# Patient Record
Sex: MALE | Race: WHITE | URBAN - METROPOLITAN AREA
[De-identification: names, ages, dates, MRNs, and addresses within clinical notes are randomized per-mention and may not be internally consistent; named-entity substitution may affect disease eponyms.]

---

## 2022-10-20 ENCOUNTER — WEB ENCOUNTER (OUTPATIENT)
Dept: URBAN - METROPOLITAN AREA CLINIC 96 | Facility: CLINIC | Age: 22
End: 2022-10-20

## 2022-11-03 ENCOUNTER — WEB ENCOUNTER (OUTPATIENT)
Dept: URBAN - METROPOLITAN AREA CLINIC 96 | Facility: CLINIC | Age: 22
End: 2022-11-03

## 2022-11-08 ENCOUNTER — OFFICE VISIT (OUTPATIENT)
Dept: URBAN - METROPOLITAN AREA CLINIC 96 | Facility: CLINIC | Age: 22
End: 2022-11-08
Payer: COMMERCIAL

## 2022-11-08 ENCOUNTER — LAB OUTSIDE AN ENCOUNTER (OUTPATIENT)
Dept: URBAN - METROPOLITAN AREA CLINIC 96 | Facility: CLINIC | Age: 22
End: 2022-11-08

## 2022-11-08 ENCOUNTER — WEB ENCOUNTER (OUTPATIENT)
Dept: URBAN - METROPOLITAN AREA CLINIC 96 | Facility: CLINIC | Age: 22
End: 2022-11-08

## 2022-11-08 VITALS
DIASTOLIC BLOOD PRESSURE: 87 MMHG | HEART RATE: 113 BPM | WEIGHT: 137 LBS | SYSTOLIC BLOOD PRESSURE: 128 MMHG | TEMPERATURE: 97.8 F | HEIGHT: 69 IN | BODY MASS INDEX: 20.29 KG/M2

## 2022-11-08 DIAGNOSIS — Z79.899 LONG-TERM USE OF IMMUNOSUPPRESSANT MEDICATION: ICD-10-CM

## 2022-11-08 DIAGNOSIS — K12.1 MOUTH ULCERS: ICD-10-CM

## 2022-11-08 DIAGNOSIS — K50.90 CROHN DISEASE: ICD-10-CM

## 2022-11-08 DIAGNOSIS — K59.01 CONSTIPATION: ICD-10-CM

## 2022-11-08 DIAGNOSIS — Z91.89 COLON CANCER HIGH RISK: ICD-10-CM

## 2022-11-08 PROCEDURE — 99205 OFFICE O/P NEW HI 60 MIN: CPT | Performed by: INTERNAL MEDICINE

## 2022-11-08 RX ORDER — ADALIMUMAB 40MG/0.4ML
1 PEN KIT SUBCUTANEOUS
Qty: 1 | Refills: 11 | OUTPATIENT
Start: 2022-11-08 | End: 2022-11-20

## 2022-11-08 RX ORDER — ADALIMUMAB 40MG/0.4ML
0.4 ML KIT SUBCUTANEOUS EVERY 2 WEEKS
Status: ACTIVE | COMMUNITY

## 2022-11-08 NOTE — HPI-TODAY'S VISIT:
Pt Head is a 21 y/o with colonic CD, currently on Humira every 2 weeks, here for eval of his condition. . Pt is referred by Dr. Henning.  . Pt was dxd with CD around age 12.  He has been on MTX, which he had lot of side effects to.  He has been on Humira since 2016.  Last colonoscopy was around 2018 (scope looked good). . Today on 11/8/2022, pt reports that he has gotten depressed.  Also developed a pneumonia recently on ABX for this.  Developing constipation since starting ABX.  Normal he has 2-4 BM per day. No blood in the stool.  Weight has gone down a little, has some mouth sores as well. . 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

## 2022-11-14 ENCOUNTER — OUT OF OFFICE VISIT (OUTPATIENT)
Dept: URBAN - METROPOLITAN AREA MEDICAL CENTER 16 | Facility: MEDICAL CENTER | Age: 22
End: 2022-11-14
Payer: COMMERCIAL

## 2022-11-14 DIAGNOSIS — R74.01 ABNORMAL/ELEVATED TRANSAMINASE (SGOT, AMINOTRANSFERASE): ICD-10-CM

## 2022-11-14 DIAGNOSIS — K50.90 ABDOMINAL PAIN DESPITE THERAPY FOR CROHN'S DISEASE: ICD-10-CM

## 2022-11-14 PROCEDURE — G8427 DOCREV CUR MEDS BY ELIG CLIN: HCPCS | Performed by: INTERNAL MEDICINE

## 2022-11-14 PROCEDURE — 99221 1ST HOSP IP/OBS SF/LOW 40: CPT | Performed by: INTERNAL MEDICINE

## 2022-11-14 PROCEDURE — 99232 SBSQ HOSP IP/OBS MODERATE 35: CPT | Performed by: INTERNAL MEDICINE

## 2022-11-18 LAB
A/G RATIO: 0.6
ABSOLUTE BASOPHILS: 49
ABSOLUTE EOSINOPHILS: 65
ABSOLUTE LYMPHOCYTES: 2673
ABSOLUTE MONOCYTES: 1426
ABSOLUTE NEUTROPHILS: (no result)
ADALIMUMAB AB, IBD: <10
ADALIMUMAB AB, IBD: <10
ADALIMUMAB LEVEL, IBD: 3.4
ALBUMIN: 2.4
ALKALINE PHOSPHATASE: 217
ALT (SGPT): 33
AST (SGOT): 43
BASOPHILS: 0.3
BILIRUBIN, TOTAL: 0.5
BUN/CREATININE RATIO: (no result)
BUN: 15
C-REACTIVE PROTEIN, QUANT: 251.9
CALCIUM: 7.8
CARBON DIOXIDE, TOTAL: 24
CHLORIDE: 98
COMMENT: (no result)
CREATININE: 0.68
EGFR: 136
EOSINOPHILS: 0.4
FERRITIN, SERUM: 999
GLOBULIN, TOTAL: 4.1
GLUCOSE: 84
HEMATOCRIT: 36
HEMOGLOBIN: 12.2
INTERPRETATION: (no result)
IRON BIND.CAP.(TIBC): 170
IRON SATURATION: 9
IRON: 16
LYMPHOCYTES: 16.5
MAGNESIUM: 2
MCH: 28.9
MCHC: 33.9
MCV: 85.3
MITOGEN-NIL: 1.99
MONOCYTES: 8.8
MPV: 9.5
NEUTROPHILS: 74
NIL: 0.08
PLATELET COUNT: 347
POTASSIUM: 5.4
PROTEIN, TOTAL: 6.5
QUANTIFERON(R)-TB GOLD: NEGATIVE
RDW: 11.9
RED BLOOD CELL COUNT: 4.22
SED RATE BY MODIFIED: 43
SODIUM: 132
TB1-NIL: <0
TB2-NIL: 0.02
VITAMIN B12: 1400
VITAMIN D,25-OH,TOTAL,IA: 44
WHITE BLOOD CELL COUNT: 16.2

## 2022-11-21 ENCOUNTER — TELEPHONE ENCOUNTER (OUTPATIENT)
Dept: URBAN - METROPOLITAN AREA CLINIC 92 | Facility: CLINIC | Age: 22
End: 2022-11-21

## 2022-11-25 ENCOUNTER — TELEPHONE ENCOUNTER (OUTPATIENT)
Dept: URBAN - METROPOLITAN AREA CLINIC 92 | Facility: CLINIC | Age: 22
End: 2022-11-25

## 2022-12-23 ENCOUNTER — OFFICE VISIT (OUTPATIENT)
Dept: URBAN - METROPOLITAN AREA TELEHEALTH 2 | Facility: TELEHEALTH | Age: 22
End: 2022-12-23
Payer: COMMERCIAL

## 2022-12-23 ENCOUNTER — LAB OUTSIDE AN ENCOUNTER (OUTPATIENT)
Dept: URBAN - METROPOLITAN AREA TELEHEALTH 2 | Facility: TELEHEALTH | Age: 22
End: 2022-12-23

## 2022-12-23 VITALS — WEIGHT: 128 LBS | BODY MASS INDEX: 18.96 KG/M2 | HEIGHT: 69 IN

## 2022-12-23 DIAGNOSIS — Z91.89 COLON CANCER HIGH RISK: ICD-10-CM

## 2022-12-23 DIAGNOSIS — R79.89 ELEVATED LFTS: ICD-10-CM

## 2022-12-23 DIAGNOSIS — K50.90 CROHN DISEASE: ICD-10-CM

## 2022-12-23 DIAGNOSIS — R76.8 HEPATITIS B CORE ANTIBODY POSITIVE: ICD-10-CM

## 2022-12-23 DIAGNOSIS — Z79.899 LONG-TERM USE OF IMMUNOSUPPRESSANT MEDICATION: ICD-10-CM

## 2022-12-23 DIAGNOSIS — K59.01 CONSTIPATION: ICD-10-CM

## 2022-12-23 DIAGNOSIS — K12.1 MOUTH ULCERS: ICD-10-CM

## 2022-12-23 PROCEDURE — 99215 OFFICE O/P EST HI 40 MIN: CPT | Performed by: INTERNAL MEDICINE

## 2022-12-23 RX ORDER — ADALIMUMAB 40MG/0.4ML
0.4 ML KIT SUBCUTANEOUS EVERY 2 WEEKS
COMMUNITY

## 2022-12-23 NOTE — HPI-TODAY'S VISIT:
Lars Jernigan is a 21 y/o with colonic CD, currently on Humira every 2 weeks, here for eval of his condition.  Pt is referred by Dr. Henning.  Pt was dxd with CD around age 12.  He has been on MTX, which he had lot of side effects to.  He has been on Humira since 2016.  Last colonoscopy was around 2018 (scope looked good).  Today on 11/8/2022, pt reports that he has gotten depressed.  Also developed a pneumonia recently on ABX for this.  Developing constipation since starting ABX.  Normal he has 2-4 BM per day. No blood in the stool.  Weight has gone down a little, has some mouth sores as well.  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  Seen by me soon after the appt with Dr. Parham, when he was in the hospital: Mr. Jernigan is a 23 yo male with history of Crohn's disease (on Humira) presenting to [facility] ED on 11/12 with cough and fever x 10 days. CXR in ED suggestive of diffuse consolidating infiltrates in the right lung suspicious for pneumonia/pneumonitis. Additionally, found to have transaminitis with positive Hep B core IgM on acute hepatitis panel. Hep B surface Ag negative. GI consulted for evaluation.   Patient reports no history of liver concerns/infection.  Denies hepatitis risk factors including accidental needlestick, sharing needles, recent tattoos, and new sexual partner.  Admits to frequent alcohol use during fraternity parties (none in the past couple weeks), daily vaping, and occasional marijuana use. Denies frequent NSAID/acetaminophen use. Denies abdominal pain/distension, n/v, hematemesis, hematochezia, and melena at this time.    Patient has a history of Crohn's disease managed on Humira.  Unable to take at this time due to current pneumonia infection.  Reports persistent, watery, non-bloody diarrhea during this admission. GI panel negative. He is a new patient of Dr. Parham.  Last colonoscopy ~5 years ago, reportedly normal.    Labs AST/ALT 35/54 from 44/66 on admission T bili 0.3, alk phos 138 from 166 on admission INR 1.21 WBC 20.00 Hgb 11.2, Hct 31.9, MCV 83.3   Prior Procedures Last colonoscopy ~5 years ago, reportedly normal.  No prior EGD.   Positive hepB surface Ab indicative of immunity to hepB, therefore ok to resume Humira once his current infection is controlled Will followup hepB PCR to be thorough - it was negative   CT 11/16/2022 IMPRESSION: : Extensive opacification throughout the right lung with multiple cystic/necrotic areas, consistent with extensive pneumonia. Fungal, tuberculous pneumonia may have this appearance.

## 2022-12-27 ENCOUNTER — TELEPHONE ENCOUNTER (OUTPATIENT)
Dept: URBAN - METROPOLITAN AREA CLINIC 118 | Facility: CLINIC | Age: 22
End: 2022-12-27

## 2022-12-29 ENCOUNTER — LAB OUTSIDE AN ENCOUNTER (OUTPATIENT)
Dept: URBAN - METROPOLITAN AREA CLINIC 96 | Facility: CLINIC | Age: 22
End: 2022-12-29

## 2022-12-30 LAB
ALBUMIN/GLOBULIN RATIO: 1.1
ALBUMIN: 3.8
ALKALINE PHOSPHATASE: 105
ALT (SGPT): 40
AST (SGOT): 21
BILIRUBIN, DIRECT: 0.1
BILIRUBIN, INDIRECT: 0.2
BILIRUBIN, TOTAL: 0.3
GLOBULIN: 3.4
HEPATITIS B SURFACE AB IMMUNITY, QN: 5
HEPATITIS B SURFACE ANTIGEN: (no result)
PROTEIN, TOTAL: 7.2
SED RATE BY MODIFIED: 129

## 2023-01-06 ENCOUNTER — OFFICE VISIT (OUTPATIENT)
Dept: URBAN - METROPOLITAN AREA TELEHEALTH 2 | Facility: TELEHEALTH | Age: 23
End: 2023-01-06

## 2023-01-09 ENCOUNTER — OFFICE VISIT (OUTPATIENT)
Dept: URBAN - METROPOLITAN AREA CLINIC 118 | Facility: CLINIC | Age: 23
End: 2023-01-09
Payer: COMMERCIAL

## 2023-01-09 VITALS
WEIGHT: 136 LBS | HEIGHT: 69 IN | SYSTOLIC BLOOD PRESSURE: 129 MMHG | BODY MASS INDEX: 20.14 KG/M2 | DIASTOLIC BLOOD PRESSURE: 72 MMHG | HEART RATE: 99 BPM

## 2023-01-09 DIAGNOSIS — K50.90 CROHN DISEASE: ICD-10-CM

## 2023-01-09 DIAGNOSIS — Z91.89 COLON CANCER HIGH RISK: ICD-10-CM

## 2023-01-09 DIAGNOSIS — Z79.899 LONG-TERM USE OF IMMUNOSUPPRESSANT MEDICATION: ICD-10-CM

## 2023-01-09 DIAGNOSIS — R76.8 HEPATITIS B CORE ANTIBODY POSITIVE: ICD-10-CM

## 2023-01-09 DIAGNOSIS — R79.89 ELEVATED LFTS: ICD-10-CM

## 2023-01-09 DIAGNOSIS — K59.01 CONSTIPATION: ICD-10-CM

## 2023-01-09 DIAGNOSIS — K12.1 MOUTH ULCERS: ICD-10-CM

## 2023-01-09 PROBLEM — 14760008 CONSTIPATION: Status: ACTIVE | Noted: 2022-11-08

## 2023-01-09 PROBLEM — 34000006 CROHN DISEASE: Status: ACTIVE | Noted: 2022-11-08

## 2023-01-09 PROCEDURE — 99214 OFFICE O/P EST MOD 30 MIN: CPT | Performed by: INTERNAL MEDICINE

## 2023-01-09 RX ORDER — ADALIMUMAB 40MG/0.4ML
0.4 ML KIT SUBCUTANEOUS EVERY 2 WEEKS
Status: ACTIVE | COMMUNITY

## 2023-01-09 NOTE — HPI-TODAY'S VISIT:
Lars Jernigan is a 23 y/o with colonic CD, currently on Humira every 2 weeks, here for followup after teleleath last month in brief had BAD pulm infection, we were consulted for poss hepB (doesn't have) LFT elevated but trending down back on humira now, was being held due to the infection    Pt was dxd with CD around age 12.  He has been on MTX, which he had lot of side effects to.  He has been on Humira since 2016.  Last colonoscopy was around 2018 (scope looked good).  Today on 11/8/2022, pt reports that he has gotten depressed.  Also developed a pneumonia recently on ABX for this.  Developing constipation since starting ABX.  Normal he has 2-4 BM per day. No blood in the stool.  Weight has gone down a little, has some mouth sores as well.  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.  Seen by me soon after the appt with Dr. Parham, when he was in the hospital: Mr. Jernigan is a 23 yo male with history of Crohn's disease (on Humira) presenting to [facility] ED on 11/12 with cough and fever x 10 days. CXR in ED suggestive of diffuse consolidating infiltrates in the right lung suspicious for pneumonia/pneumonitis. Additionally, found to have transaminitis with positive Hep B core IgM on acute hepatitis panel. Hep B surface Ag negative. GI consulted for evaluation.   Patient reports no history of liver concerns/infection.  Denies hepatitis risk factors including accidental needlestick, sharing needles, recent tattoos, and new sexual partner.  Admits to frequent alcohol use during fraternity parties (none in the past couple weeks), daily vaping, and occasional marijuana use. Denies frequent NSAID/acetaminophen use. Denies abdominal pain/distension, n/v, hematemesis, hematochezia, and melena at this time.    Patient has a history of Crohn's disease managed on Humira.  Unable to take at this time due to current pneumonia infection.  Reports persistent, watery, non-bloody diarrhea during this admission. GI panel negative. He is a new patient of Dr. Parham.  Last colonoscopy ~5 years ago, reportedly normal.    Labs AST/ALT 35/54 from 44/66 on admission T bili 0.3, alk phos 138 from 166 on admission INR 1.21 WBC 20.00 Hgb 11.2, Hct 31.9, MCV 83.3   Prior Procedures Last colonoscopy ~5 years ago, reportedly normal.  No prior EGD.   Positive hepB surface Ab indicative of immunity to hepB, therefore ok to resume Humira once his current infection is controlled Will followup hepB PCR to be thorough - it was negative   CT 11/16/2022 IMPRESSION: : Extensive opacification throughout the right lung with multiple cystic/necrotic areas, consistent with extensive pneumonia. Fungal, tuberculous pneumonia may have this appearance.

## 2023-02-20 ENCOUNTER — LAB OUTSIDE AN ENCOUNTER (OUTPATIENT)
Dept: URBAN - METROPOLITAN AREA CLINIC 118 | Facility: CLINIC | Age: 23
End: 2023-02-20

## 2023-03-07 ENCOUNTER — LAB OUTSIDE AN ENCOUNTER (OUTPATIENT)
Dept: URBAN - METROPOLITAN AREA CLINIC 118 | Facility: CLINIC | Age: 23
End: 2023-03-07

## 2023-03-08 LAB
ADALIMUMAB AB, IBD: <10
ADALIMUMAB AB, IBD: <10
ADALIMUMAB LEVEL, IBD: 5.9
ALBUMIN/GLOBULIN RATIO: 1.7
ALBUMIN: 4.3
ALKALINE PHOSPHATASE: 70
ALT (SGPT): 24
AST (SGOT): 22
BILIRUBIN, DIRECT: 0.1
BILIRUBIN, INDIRECT: 0.4
BILIRUBIN, TOTAL: 0.5
COMMENT: (no result)
GLOBULIN: 2.5
INTERPRETATION: (no result)
PROTEIN, TOTAL: 6.8

## 2023-03-20 ENCOUNTER — OFFICE VISIT (OUTPATIENT)
Dept: URBAN - METROPOLITAN AREA TELEHEALTH 2 | Facility: TELEHEALTH | Age: 23
End: 2023-03-20

## 2023-12-04 ENCOUNTER — TELEPHONE ENCOUNTER (OUTPATIENT)
Dept: URBAN - METROPOLITAN AREA CLINIC 118 | Facility: CLINIC | Age: 23
End: 2023-12-04

## 2023-12-04 RX ORDER — ADALIMUMAB 40MG/0.4ML
0.4 ML KIT SUBCUTANEOUS EVERY 2 WEEKS
Qty: 2 | Refills: 0

## 2023-12-22 ENCOUNTER — DASHBOARD ENCOUNTERS (OUTPATIENT)
Age: 23
End: 2023-12-22

## 2023-12-22 ENCOUNTER — OFFICE VISIT (OUTPATIENT)
Dept: URBAN - METROPOLITAN AREA CLINIC 109 | Facility: CLINIC | Age: 23
End: 2023-12-22
Payer: COMMERCIAL

## 2023-12-22 ENCOUNTER — LAB OUTSIDE AN ENCOUNTER (OUTPATIENT)
Dept: URBAN - METROPOLITAN AREA CLINIC 109 | Facility: CLINIC | Age: 23
End: 2023-12-22

## 2023-12-22 VITALS
HEIGHT: 69 IN | HEART RATE: 45 BPM | TEMPERATURE: 97.7 F | DIASTOLIC BLOOD PRESSURE: 71 MMHG | WEIGHT: 148.8 LBS | BODY MASS INDEX: 22.04 KG/M2 | SYSTOLIC BLOOD PRESSURE: 126 MMHG

## 2023-12-22 DIAGNOSIS — K59.01 CONSTIPATION: ICD-10-CM

## 2023-12-22 DIAGNOSIS — K50.90 CROHN DISEASE: ICD-10-CM

## 2023-12-22 DIAGNOSIS — K12.1 MOUTH ULCERS: ICD-10-CM

## 2023-12-22 DIAGNOSIS — Z79.899 LONG-TERM USE OF IMMUNOSUPPRESSANT MEDICATION: ICD-10-CM

## 2023-12-22 DIAGNOSIS — R79.89 ELEVATED LFTS: ICD-10-CM

## 2023-12-22 DIAGNOSIS — Z91.89 COLON CANCER HIGH RISK: ICD-10-CM

## 2023-12-22 PROCEDURE — 99213 OFFICE O/P EST LOW 20 MIN: CPT | Performed by: INTERNAL MEDICINE

## 2023-12-22 RX ORDER — LISDEXAMFETAMINE DIMESYLATE 40 MG/1
TAKE ONE CAPSULE BY MOUTH ONE TIME DAILY CAPSULE ORAL
Qty: 10 UNSPECIFIED | Refills: 0 | Status: ACTIVE | COMMUNITY

## 2023-12-22 RX ORDER — ZOLPIDEM TARTRATE 5 MG/1
TABLET ORAL
Qty: 30 TABLET | Status: ACTIVE | COMMUNITY

## 2023-12-22 RX ORDER — ADALIMUMAB 40MG/0.4ML
0.4 ML KIT SUBCUTANEOUS EVERY 2 WEEKS
Qty: 2 | Refills: 0 | Status: ACTIVE | COMMUNITY

## 2023-12-22 RX ORDER — ALBUTEROL SULFATE 2.5 MG/3ML
SOLUTION RESPIRATORY (INHALATION)
Qty: 75 MILLILITER | Status: ACTIVE | COMMUNITY

## 2023-12-22 RX ORDER — DEXTROAMPHETAMINE SULFATE, DEXTROAMPHETAMINE SACCHARATE, AMPHETAMINE SULFATE AND AMPHETAMINE ASPARTATE 5; 5; 5; 5 MG/1; MG/1; MG/1; MG/1
CAPSULE, EXTENDED RELEASE ORAL
Qty: 30 CAPSULE | Status: ON HOLD | COMMUNITY

## 2023-12-22 RX ORDER — ADALIMUMAB 40MG/0.4ML
0.4 ML KIT SUBCUTANEOUS EVERY 2 WEEKS
Qty: 2 | Refills: 11

## 2023-12-22 NOTE — HPI-TODAY'S VISIT:
Last seen early 2023 denies any flares since then  cut back on the marijuana, reports no longer abusing Rx opiates (was in a bad place last year)  reports taking the humira every two weeks, cannot tell me thedates or days he takes it though, reports his mom tracks all that  finishing up last year [employer] in  ([employer]). He says boring but easy   Pt Delon is a 21 y/o with colonic CD, currently on Humira every 2 weeks, here for followup after teleleath last month in brief had BAD pulm infection, we were consulted for poss hepB (doesn't have) LFT elevated but trending down back on humira now, was being held due to the infection    Pt was dxd with CD around age 12.  He has been on MTX, which he had lot of side effects to.  He has been on Humira since 2016.  Last colonoscopy was around 2018 (scope looked good).  Today on 11/8/2022, pt reports that he has gotten depressed.  Also developed a pneumonia recently on ABX for this.  Developing constipation since starting ABX.  Normal he has 2-4 BM per day. No blood in the stool.  Weight has gone down a little, has some mouth sores as well.  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  Seen by me soon after the appt with Dr. Parham, when he was in the hospital: Mr. Jernigan is a 21 yo male with history of Crohn's disease (on Humira) presenting to [facility] ED on 11/12 with cough and fever x 10 days. CXR in ED suggestive of diffuse consolidating infiltrates in the right lung suspicious for pneumonia/pneumonitis. Additionally, found to have transaminitis with positive Hep B core IgM on acute hepatitis panel. Hep B surface Ag negative. GI consulted for evaluation.   Patient reports no history of liver concerns/infection.  Denies hepatitis risk factors including accidental needlestick, sharing needles, recent tattoos, and new sexual partner.  Admits to frequent alcohol use during fraternity parties (none in the past couple weeks), daily vaping, and occasional marijuana use. Denies frequent NSAID/acetaminophen use. Denies abdominal pain/distension, n/v, hematemesis, hematochezia, and melena at this time.    Patient has a history of Crohn's disease managed on Humira.  Unable to take at this time due to current pneumonia infection.  Reports persistent, watery, non-bloody diarrhea during this admission. GI panel negative. He is a new patient of Dr. Parham.  Last colonoscopy ~5 years ago, reportedly normal.    Labs AST/ALT 35/54 from 44/66 on admission T bili 0.3, alk phos 138 from 166 on admission INR 1.21 WBC 20.00 Hgb 11.2, Hct 31.9, MCV 83.3   Prior Procedures Last colonoscopy ~5 years ago, reportedly normal.  No prior EGD.   Positive hepB surface Ab indicative of immunity to hepB, therefore ok to resume Humira once his current infection is controlled Will followup hepB PCR to be thorough - it was negative   CT 11/16/2022 IMPRESSION: : Extensive opacification throughout the right lung with multiple cystic/necrotic areas, consistent with extensive pneumonia. Fungal, tuberculous pneumonia may have this appearance.

## 2024-01-18 ENCOUNTER — TELEPHONE ENCOUNTER (OUTPATIENT)
Dept: URBAN - METROPOLITAN AREA CLINIC 6 | Facility: CLINIC | Age: 24
End: 2024-01-18

## 2025-02-23 ENCOUNTER — TELEPHONE ENCOUNTER (OUTPATIENT)
Dept: URBAN - METROPOLITAN AREA CLINIC 109 | Facility: CLINIC | Age: 25
End: 2025-02-23

## 2025-02-23 RX ORDER — ADALIMUMAB 40MG/0.4ML
0.4 ML KIT SUBCUTANEOUS EVERY 2 WEEKS
Qty: 6 KIT | Refills: 0

## 2025-02-24 ENCOUNTER — P2P PATIENT RECORD (OUTPATIENT)
Age: 25
End: 2025-02-24

## 2025-03-12 ENCOUNTER — OFFICE VISIT (OUTPATIENT)
Dept: URBAN - METROPOLITAN AREA CLINIC 118 | Facility: CLINIC | Age: 25
End: 2025-03-12
Payer: COMMERCIAL

## 2025-03-12 ENCOUNTER — P2P PATIENT RECORD (OUTPATIENT)
Age: 25
End: 2025-03-12

## 2025-03-12 VITALS
BODY MASS INDEX: 21.56 KG/M2 | WEIGHT: 145.6 LBS | HEIGHT: 69 IN | TEMPERATURE: 97.9 F | DIASTOLIC BLOOD PRESSURE: 75 MMHG | SYSTOLIC BLOOD PRESSURE: 117 MMHG | HEART RATE: 71 BPM

## 2025-03-12 DIAGNOSIS — K50.90 CROHN DISEASE: ICD-10-CM

## 2025-03-12 DIAGNOSIS — Z91.89 COLON CANCER HIGH RISK: ICD-10-CM

## 2025-03-12 DIAGNOSIS — K12.1 MOUTH ULCERS: ICD-10-CM

## 2025-03-12 DIAGNOSIS — R79.89 ELEVATED LFTS: ICD-10-CM

## 2025-03-12 DIAGNOSIS — K59.01 CONSTIPATION: ICD-10-CM

## 2025-03-12 DIAGNOSIS — Z79.899 LONG-TERM USE OF IMMUNOSUPPRESSANT MEDICATION: ICD-10-CM

## 2025-03-12 PROCEDURE — 99214 OFFICE O/P EST MOD 30 MIN: CPT | Performed by: INTERNAL MEDICINE

## 2025-03-12 RX ORDER — ZOLPIDEM TARTRATE 5 MG/1
TABLET ORAL
Qty: 30 TABLET | COMMUNITY

## 2025-03-12 RX ORDER — DEXTROAMPHETAMINE SULFATE, DEXTROAMPHETAMINE SACCHARATE, AMPHETAMINE SULFATE AND AMPHETAMINE ASPARTATE 5; 5; 5; 5 MG/1; MG/1; MG/1; MG/1
CAPSULE, EXTENDED RELEASE ORAL
Qty: 30 CAPSULE | Status: ON HOLD | COMMUNITY

## 2025-03-12 RX ORDER — ADALIMUMAB 40MG/0.4ML
0.4 ML KIT SUBCUTANEOUS EVERY 2 WEEKS
Qty: 2 | Refills: 11

## 2025-03-12 RX ORDER — ADALIMUMAB 40MG/0.4ML
0.4 ML KIT SUBCUTANEOUS EVERY 2 WEEKS
Qty: 6 KIT | Refills: 0 | COMMUNITY

## 2025-03-12 RX ORDER — LISDEXAMFETAMINE DIMESYLATE 40 MG/1
TAKE ONE CAPSULE BY MOUTH ONE TIME DAILY CAPSULE ORAL
Qty: 10 UNSPECIFIED | Refills: 0 | COMMUNITY

## 2025-03-12 RX ORDER — ALBUTEROL SULFATE 2.5 MG/3ML
SOLUTION RESPIRATORY (INHALATION)
Qty: 75 MILLILITER | COMMUNITY

## 2025-03-12 NOTE — HPI-TODAY'S VISIT:
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  He has been on MTX, which he had lot of side effects to.  He has been on Humira since 2016.  Last colonoscopy was around 2018 (scope looked good).  Today on 11/8/2022, pt reports that he has gotten depressed.  Also developed a pneumonia recently on ABX for this.  Developing constipation since starting ABX.  Normal he has 2-4 BM per day. No blood in the stool.  Weight has gone down a little, has some mouth sores as well.  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  Seen by me soon after the appt with Dr. Parham, when he was in the hospital: Mr. Jernigan is a 21 yo male with history of Crohn's disease (on Humira) presenting to [facility] ED on 11/12 with cough and fever x 10 days. CXR in ED suggestive of diffuse consolidating infiltrates in the right lung suspicious for pneumonia/pneumonitis. Additionally, found to have transaminitis with positive Hep B core IgM on acute hepatitis panel. Hep B surface Ag negative. GI consulted for evaluation.   Patient reports no history of liver concerns/infection.  Denies hepatitis risk factors including accidental needlestick, sharing needles, recent tattoos, and new sexual partner.  Admits to frequent alcohol use during fraternity parties (none in the past couple weeks), daily vaping, and occasional marijuana use. Denies frequent NSAID/acetaminophen use. Denies abdominal pain/distension, n/v, hematemesis, hematochezia, and melena at this time.    Patient has a history of Crohn's disease managed on Humira.  Unable to take at this time due to current pneumonia infection.  Reports persistent, watery, non-bloody diarrhea during this admission. GI panel negative. He is a new patient of Dr. Parham.  Last colonoscopy ~5 years ago, reportedly normal.    Labs AST/ALT 35/54 from 44/66 on admission T bili 0.3, alk phos 138 from 166 on admission INR 1.21 WBC 20.00 Hgb 11.2, Hct 31.9, MCV 83.3   Prior Procedures Last colonoscopy ~5 years ago, reportedly normal.  No prior EGD.   Positive hepB surface Ab indicative of immunity to hepB, therefore ok to resume Humira once his current infection is controlled Will followup hepB PCR to be thorough - it was negative   CT 11/16/2022 IMPRESSION: : Extensive opacification throughout the right lung with multiple cystic/necrotic areas, consistent with extensive pneumonia. Fungal, tuberculous pneumonia may have this appearance.

## 2025-03-17 LAB
ADALIMUMAB AB, IBD: <10
ADALIMUMAB LEVEL, IBD: 8.9
COMMENT: (no result)
HEPATITIS B SURFACE ANTIGEN: (no result)
INTERPRETATION: (no result)
MITOGEN-NIL: 8.39
QUANTIFERON NIL VALUE: 0.03
QUANTIFERON TB1 AG VALUE: 0.01
QUANTIFERON TB2 AG VALUE: 0.01
QUANTIFERON-TB GOLD PLUS: NEGATIVE

## 2025-03-26 ENCOUNTER — OFFICE VISIT (OUTPATIENT)
Dept: URBAN - METROPOLITAN AREA SURGERY CENTER 23 | Facility: SURGERY CENTER | Age: 25
End: 2025-03-26

## 2025-03-26 ENCOUNTER — CLAIMS CREATED FROM THE CLAIM WINDOW (OUTPATIENT)
Dept: URBAN - METROPOLITAN AREA CLINIC 4 | Facility: CLINIC | Age: 25
End: 2025-03-26
Payer: COMMERCIAL

## 2025-03-26 DIAGNOSIS — K63.89 OTHER SPECIFIED DISEASES OF INTESTINE: ICD-10-CM

## 2025-03-26 PROCEDURE — 88305 TISSUE EXAM BY PATHOLOGIST: CPT | Performed by: PATHOLOGY

## 2025-03-26 RX ORDER — LISDEXAMFETAMINE DIMESYLATE 40 MG/1
TAKE ONE CAPSULE BY MOUTH ONE TIME DAILY CAPSULE ORAL
Qty: 10 UNSPECIFIED | Refills: 0 | COMMUNITY

## 2025-03-26 RX ORDER — ADALIMUMAB 40MG/0.4ML
0.4 ML KIT SUBCUTANEOUS EVERY 2 WEEKS
Qty: 2 | Refills: 11 | Status: ACTIVE | COMMUNITY

## 2025-03-26 RX ORDER — DEXTROAMPHETAMINE SULFATE, DEXTROAMPHETAMINE SACCHARATE, AMPHETAMINE SULFATE AND AMPHETAMINE ASPARTATE 5; 5; 5; 5 MG/1; MG/1; MG/1; MG/1
CAPSULE, EXTENDED RELEASE ORAL
Qty: 30 CAPSULE | Status: ON HOLD | COMMUNITY

## 2025-03-26 RX ORDER — ZOLPIDEM TARTRATE 5 MG/1
TABLET ORAL
Qty: 30 TABLET | COMMUNITY

## 2025-03-26 RX ORDER — ALBUTEROL SULFATE 2.5 MG/3ML
SOLUTION RESPIRATORY (INHALATION)
Qty: 75 MILLILITER | COMMUNITY

## 2025-03-30 LAB — CALPROTECTIN, FECAL: 72

## 2025-05-20 ENCOUNTER — LAB OUTSIDE AN ENCOUNTER (OUTPATIENT)
Dept: URBAN - METROPOLITAN AREA CLINIC 118 | Facility: CLINIC | Age: 25
End: 2025-05-20